# Patient Record
Sex: FEMALE | Race: OTHER | HISPANIC OR LATINO | Employment: OTHER | URBAN - METROPOLITAN AREA
[De-identification: names, ages, dates, MRNs, and addresses within clinical notes are randomized per-mention and may not be internally consistent; named-entity substitution may affect disease eponyms.]

---

## 2022-09-23 ENCOUNTER — APPOINTMENT (EMERGENCY)
Dept: CT IMAGING | Facility: HOSPITAL | Age: 62
End: 2022-09-23

## 2022-09-23 ENCOUNTER — HOSPITAL ENCOUNTER (EMERGENCY)
Facility: HOSPITAL | Age: 62
Discharge: HOME | End: 2022-09-23
Attending: EMERGENCY MEDICINE

## 2022-09-23 VITALS
DIASTOLIC BLOOD PRESSURE: 92 MMHG | TEMPERATURE: 98.8 F | SYSTOLIC BLOOD PRESSURE: 156 MMHG | WEIGHT: 133.6 LBS | HEART RATE: 74 BPM | OXYGEN SATURATION: 98 % | RESPIRATION RATE: 18 BRPM

## 2022-09-23 DIAGNOSIS — M54.50 ACUTE LOW BACK PAIN: Primary | ICD-10-CM

## 2022-09-23 DIAGNOSIS — M48.50XA SPINAL COMPRESSION FRACTURE (HCC): ICD-10-CM

## 2022-09-23 DIAGNOSIS — M54.2 ACUTE NECK PAIN: ICD-10-CM

## 2022-09-23 PROCEDURE — 72131 CT LUMBAR SPINE W/O DYE: CPT

## 2022-09-23 PROCEDURE — 72125 CT NECK SPINE W/O DYE: CPT

## 2022-09-23 PROCEDURE — 99284 EMERGENCY DEPT VISIT MOD MDM: CPT

## 2022-09-23 PROCEDURE — 99284 EMERGENCY DEPT VISIT MOD MDM: CPT | Performed by: EMERGENCY MEDICINE

## 2022-09-23 PROCEDURE — G1004 CDSM NDSC: HCPCS

## 2022-09-23 RX ORDER — ACETAMINOPHEN 325 MG/1
975 TABLET ORAL ONCE
Status: COMPLETED | OUTPATIENT
Start: 2022-09-23 | End: 2022-09-23

## 2022-09-23 RX ORDER — OXYCODONE HYDROCHLORIDE AND ACETAMINOPHEN 5; 325 MG/1; MG/1
1 TABLET ORAL EVERY 6 HOURS PRN
Qty: 20 TABLET | Refills: 0 | Status: SHIPPED | OUTPATIENT
Start: 2022-09-23 | End: 2022-10-03

## 2022-09-23 RX ORDER — LIDOCAINE 50 MG/G
2 PATCH TOPICAL ONCE
Status: DISCONTINUED | OUTPATIENT
Start: 2022-09-23 | End: 2022-09-24 | Stop reason: HOSPADM

## 2022-09-23 RX ORDER — LIDOCAINE 50 MG/G
2 PATCH TOPICAL ONCE
Status: DISCONTINUED | OUTPATIENT
Start: 2022-09-23 | End: 2022-09-23

## 2022-09-23 RX ADMIN — LIDOCAINE 2 PATCH: 50 PATCH TOPICAL at 19:55

## 2022-09-23 RX ADMIN — ACETAMINOPHEN 975 MG: 325 TABLET ORAL at 19:54

## 2022-09-24 NOTE — ED NOTES
Patient returned from [address] [facility]  09/23/22 2019

## 2022-09-24 NOTE — DISCHARGE INSTRUCTIONS
Puede westley Tylenol 650 mg cada 4-6 horas según sea necesario para el dolor  También puede aplicar Voltaren Gel 4 veces al día en áreas de dolor  Llame a allen médico para bryan reevaluación dentro de 2-3 días  Regrese al departamento de emergencias si desarrolla entumecimiento o debilidad en las piernas, disfunción intestinal o vesical, empeoramiento intenso del dolor, cualquier otro síntoma que le preocupe

## 2022-09-24 NOTE — ED PROVIDER NOTES
History  Chief Complaint   Patient presents with    Back Pain     Pt was working out yesterday and fell backwards  Lower back and R shoulder hurts  Tylenol 3 pm 2 tabs     78-year-old female with history of Parkinson's disease presents to the emergency department for neck and back pain  Patient says that she was stretching when she lost her balance and fell backwards yesterday  Denies any head strike  No loss of consciousness  No anticoagulant or antiplatelet agent use  Says that she was able to get up on her own has been ambulating but now has pain to her neck radiating into the right trapezius area and low back  No saddle anaesthesia, bowel or bladder dysfunction, lower extremity weakness or numbness  Back pain is moderate in severity, worse with movement and ambulation, constant, localized midline and right paraspinous, nonradiating  Denies headache, nchest pain, abdominal pain, extremity pain, focal neurologic symptoms, or any other injuries or complaints  None       Past Medical History:   Diagnosis Date    Parkinson's disease ([facility] [address] )        History reviewed  No pertinent surgical history  History reviewed  No pertinent family history  I have reviewed and agree with the history as documented  E-Cigarette/Vaping    E-Cigarette Use Never User      E-Cigarette/Vaping Substances    Nicotine No     THC No     CBD No     Flavoring No     Other No     Unknown No      Social History     Tobacco Use    Smoking status: Never Smoker    Smokeless tobacco: Never Used   Vaping Use    Vaping Use: Never used   Substance Use Topics    Alcohol use: Not Currently    Drug use: Never       Review of Systems   Constitutional: Negative  Negative for chills and fever  HENT: Negative  Negative for rhinorrhea  Eyes: Negative  Respiratory: Negative  Negative for cough and shortness of breath  Cardiovascular: Negative  Negative for chest pain and leg swelling  Gastrointestinal: Negative  Negative for abdominal pain, diarrhea, nausea and vomiting  Genitourinary: Negative  Negative for dysuria, flank pain and frequency  Musculoskeletal: Negative  Negative for back pain and neck pain  Skin: Negative  Negative for rash  Neurological: Negative  Negative for light-headedness and headaches  All other systems reviewed and are negative  Physical Exam  Physical Exam  Vitals and nursing note reviewed  Constitutional:       General: She is not in acute distress  Appearance: She is well-developed  HENT:      Head: Normocephalic and atraumatic  Comments: No craniofacial ecchymosis, crepitus, or deformity  No burgos's sign  No raccoon eyes  Mouth/Throat:      Mouth: Mucous membranes are moist    Eyes:      Pupils: Pupils are equal, round, and reactive to light  Cardiovascular:      Rate and Rhythm: Normal rate and regular rhythm  Pulses:           Radial pulses are 2+ on the right side and 2+ on the left side  Heart sounds: Normal heart sounds  No murmur heard  No friction rub  No gallop  Pulmonary:      Effort: Pulmonary effort is normal  No respiratory distress  Breath sounds: Normal breath sounds  No stridor  No wheezing, rhonchi or rales  Abdominal:      Palpations: Abdomen is soft  Tenderness: There is no abdominal tenderness  There is no guarding or rebound  Musculoskeletal:         General: Tenderness (lumbosacral midline and right paraspinous area  no stepoffs or deformities  ) present  No swelling  Normal range of motion  Cervical back: Normal range of motion and neck supple  Tenderness (mild right paraspinous tenderness  no stepoffs or deformities  ) present  Right lower leg: No edema  Left lower leg: No edema  Skin:     General: Skin is warm and dry  Capillary Refill: Capillary refill takes less than 2 seconds  Neurological:      Mental Status: She is alert and oriented to person, place, and time        Cranial Nerves: No cranial nerve deficit  Comments: 5/5 strength in bilateral lower extremities  Distal sensation intact  Vital Signs  ED Triage Vitals   Temperature Pulse Respirations Blood Pressure SpO2   09/23/22 1936 09/23/22 1936 09/23/22 1936 09/23/22 1936 09/23/22 1936   98 8 °F (37 1 °C) 74 18 156/92 98 %      Temp Source Heart Rate Source Patient Position - Orthostatic VS BP Location FiO2 (%)   09/23/22 1936 09/23/22 1936 09/23/22 1936 09/23/22 1936 --   Tympanic Monitor Sitting Left arm       Pain Score       09/23/22 1954       9           Vitals:    09/23/22 1936   BP: 156/92   Pulse: 74   Patient Position - Orthostatic VS: Sitting         Visual Acuity      ED Medications  Medications   lidocaine (LIDODERM) 5 % patch 2 patch (2 patches Topical Medication Applied 9/23/22 1955)   acetaminophen (TYLENOL) tablet 975 mg (975 mg Oral Given 9/23/22 1954)       Diagnostic Studies  Results Reviewed     None                 CT spine lumbar without contrast   Final Result by Zaid Harvey DO (09/23 2124)      Compression fracture superior endplate of L1 with mild height loss  No bony retropulsion  Multilevel degenerative changes as described  The study was marked in [facility] for immediate notification  Workstation performed: [id number]         CT spine cervical without contrast   Final Result by Zaid Harvey DO (09/23 2119)      No cervical spine fracture or traumatic malalignment  Incidental thyroid nodule(s) for which nonemergent thyroid ultrasound is recommended  Workstation performed: [id number]                    Procedures  Procedures         ED Course  ED Course as of 09/23/22 2242   Fri Sep 23, 2022   2152 Spoke with Dr Yasir Sherman, radiology regarding read:  most likely acute compression fx     2216 Spoke with Dr Clive Boyd from [address] [place] [address]  If neuro intact and can get around without issues, pain meds and follow up with NSG next week                                   SBIRT 22yo+    Flowsheet Row Most Recent Value   SBIRT (25 yo +)    In order to provide better care to our patients, we are screening all of our patients for alcohol and drug use  Would it be okay to ask you these screening questions? No Filed at: 09/23/2022 1940                      Disposition  Final diagnoses:   Acute low back pain   Acute neck pain   Spinal compression fracture ([address] )     Time reflects when diagnosis was documented in both MDM as applicable and the Disposition within this note     Time User Action Codes Description Comment    9/23/2022  8:48 PM Minor, Mireille Add [M54 50] Acute low back pain     9/23/2022  8:48 PM Minor, Mireille Add [M54 2] Acute neck pain     9/23/2022 10:12 PM Mliss Stamp Add [G08 90QC] Spinal compression fracture [facility])       ED Disposition     ED Disposition   Discharge    Condition   Stable    Date/Time   Fri Sep 23, 2022 10:12 PM    Comment   Clotilde Johnson discharge to home/self care  Follow-up Information     Follow up With Specialties Details Why Contact Info Additional Information    Your primary care doctor  Call in 1 day       [facility]   [address] [facility] [address] [facility] [address]  [address] [place], [place], [address] [phone]          Discharge Medication List as of 9/23/2022 10:14 PM      START taking these medications    Details   Diclofenac Sodium (VOLTAREN) 1 % Apply 2 g topically 4 (four) times a day, Starting Fri 9/23/2022, Normal      oxyCODONE-acetaminophen (Percocet) 5-325 mg per tablet Take 1 tablet by mouth every 6 (six) hours as needed for moderate pain for up to 10 days Max Daily Amount: 4 tablets, Starting Fri 9/23/2022, Until Mon 10/3/2022 at 2359, Normal             No discharge procedures on file      PDMP Review     None          ED Provider  Electronically Signed by           Jose Luis Marquez MD  09/23/22 7614

## 2023-10-27 ENCOUNTER — HOSPITAL ENCOUNTER (EMERGENCY)
Facility: HOSPITAL | Age: 63
Discharge: HOME | End: 2023-10-27
Attending: EMERGENCY MEDICINE
Payer: COMMERCIAL

## 2023-10-27 VITALS
TEMPERATURE: 98.1 F | DIASTOLIC BLOOD PRESSURE: 72 MMHG | OXYGEN SATURATION: 98 % | WEIGHT: 136.24 LBS | RESPIRATION RATE: 18 BRPM | SYSTOLIC BLOOD PRESSURE: 128 MMHG | HEART RATE: 87 BPM

## 2023-10-27 DIAGNOSIS — L02.91 ABSCESS: Primary | ICD-10-CM

## 2023-10-27 PROCEDURE — 99282 EMERGENCY DEPT VISIT SF MDM: CPT

## 2023-10-27 PROCEDURE — 99284 EMERGENCY DEPT VISIT MOD MDM: CPT | Performed by: EMERGENCY MEDICINE

## 2023-10-27 PROCEDURE — 10061 I&D ABSCESS COMP/MULTIPLE: CPT | Performed by: EMERGENCY MEDICINE

## 2023-10-27 RX ORDER — LIDOCAINE HYDROCHLORIDE AND EPINEPHRINE 10; 10 MG/ML; UG/ML
10 INJECTION, SOLUTION INFILTRATION; PERINEURAL ONCE
Status: COMPLETED | OUTPATIENT
Start: 2023-10-27 | End: 2023-10-27

## 2023-10-27 RX ORDER — TRAMADOL HYDROCHLORIDE 50 MG/1
100 TABLET ORAL DAILY PRN
COMMUNITY
Start: 2023-10-10

## 2023-10-27 RX ADMIN — LIDOCAINE HYDROCHLORIDE,EPINEPHRINE BITARTRATE 10 ML: 10; .01 INJECTION, SOLUTION INFILTRATION; PERINEURAL at 14:41

## 2023-10-27 NOTE — ED PROVIDER NOTES
History  Chief Complaint   Patient presents with    Abscess     Pt reports she feels "ball under skin" under her armpit that is painful and producing pus. States she noticed this last week. HPI  70year old female presenting with right axilla swelling. States that she gets abscesses in other areas often but first time in her right axilla. It has been producing pus but stopped and now swelling and pain is worse. Symptom started since 1 week ago. Patient reports no fever, chills, n/v, diarrhea. No complaints other than the abscess    Prior to Admission Medications   Prescriptions Last Dose Informant Patient Reported? Taking? Diclofenac Sodium (VOLTAREN) 1 %   No No   Sig: Apply 2 g topically 4 (four) times a day   carbidopa-levodopa (SINEMET)  mg per tablet   Yes Yes   Sig: Take 1.5 tablets by mouth 4 (four) times a day   traMADol (ULTRAM) 50 mg tablet   Yes Yes   Sig: Take 100 mg by mouth daily as needed      Facility-Administered Medications: None       Past Medical History:   Diagnosis Date    Parkinson's disease        Past Surgical History:   Procedure Laterality Date    US GUIDED THYROID BIOPSY  5/27/2021    US GUIDED THYROID BIOPSY  3/1/2021       History reviewed. No pertinent family history. I have reviewed and agree with the history as documented. E-Cigarette/Vaping    E-Cigarette Use Never User      E-Cigarette/Vaping Substances    Nicotine No     THC No     CBD No     Flavoring No     Other No     Unknown No      Social History     Tobacco Use    Smoking status: Never    Smokeless tobacco: Never   Vaping Use    Vaping Use: Never used   Substance Use Topics    Alcohol use: Not Currently    Drug use: Never       Review of Systems   Constitutional:  Negative for chills, diaphoresis, fever and unexpected weight change. HENT:  Negative for ear pain and sore throat. Eyes:  Negative for visual disturbance. Respiratory:  Negative for cough, chest tightness and shortness of breath. Cardiovascular:  Negative for chest pain and leg swelling. Gastrointestinal:  Negative for abdominal distention, abdominal pain, constipation, diarrhea, nausea and vomiting. Endocrine: Negative. Genitourinary:  Negative for difficulty urinating and dysuria. Musculoskeletal: Negative. Skin:         Swelling in right axill     Allergic/Immunologic: Negative. Neurological: Negative. Hematological: Negative. Psychiatric/Behavioral: Negative. All other systems reviewed and are negative. Physical Exam  Physical Exam  Vitals and nursing note reviewed. Constitutional:       General: She is not in acute distress. Appearance: Normal appearance. She is not ill-appearing. HENT:      Head: Normocephalic and atraumatic. Right Ear: External ear normal.      Left Ear: External ear normal.      Nose: Nose normal.      Mouth/Throat:      Mouth: Mucous membranes are moist.      Pharynx: Oropharynx is clear. Eyes:      General: No scleral icterus. Right eye: No discharge. Left eye: No discharge. Extraocular Movements: Extraocular movements intact. Conjunctiva/sclera: Conjunctivae normal.      Pupils: Pupils are equal, round, and reactive to light. Cardiovascular:      Rate and Rhythm: Normal rate and regular rhythm. Pulses: Normal pulses. Heart sounds: Normal heart sounds. Pulmonary:      Effort: Pulmonary effort is normal.      Breath sounds: Normal breath sounds. Abdominal:      General: Abdomen is flat. Bowel sounds are normal. There is no distension. Palpations: Abdomen is soft. Tenderness: There is no abdominal tenderness. There is no guarding or rebound. Musculoskeletal:         General: Normal range of motion. Cervical back: Normal range of motion and neck supple. Skin:     General: Skin is warm and dry. Capillary Refill: Capillary refill takes less than 2 seconds.       Comments: Fluctuant abscess in right axilla roughly 3x3 cm   Neurological:      General: No focal deficit present. Mental Status: She is alert and oriented to person, place, and time. Mental status is at baseline. Psychiatric:         Mood and Affect: Mood normal.         Behavior: Behavior normal.         Thought Content: Thought content normal.         Judgment: Judgment normal.         Vital Signs  ED Triage Vitals   Temperature Pulse Respirations Blood Pressure SpO2   10/27/23 1419 10/27/23 1419 10/27/23 1426 10/27/23 1419 10/27/23 1419   98.1 °F (36.7 °C) 87 18 128/72 98 %      Temp Source Heart Rate Source Patient Position - Orthostatic VS BP Location FiO2 (%)   10/27/23 1419 -- 10/27/23 1419 10/27/23 1419 --   Oral  Sitting Right arm       Pain Score       --                  Vitals:    10/27/23 1419   BP: 128/72   Pulse: 87   Patient Position - Orthostatic VS: Sitting         Visual Acuity      ED Medications  Medications   lidocaine-epinephrine (XYLOCAINE/EPINEPHRINE) 1 %-1:100,000 injection 10 mL (10 mL Infiltration Given by Other 10/27/23 1441)       Diagnostic Studies  Results Reviewed       None                   No orders to display              Procedures  Incision and drain    Date/Time: 10/27/2023 2:33 PM    Performed by: Kurt Mendoza MD  Authorized by: Kurt Mendoza MD  Universal Protocol:  Consent: Verbal consent obtained. Consent given by: patient  Patient understanding: patient states understanding of the procedure being performed  Patient consent: the patient's understanding of the procedure matches consent given  Patient identity confirmed: hospital-assigned identification number    Patient location:  ED  Location:     Type:  Abscess    Size:  3x3 cm    Location: right axila.   Pre-procedure details:     Skin preparation:  Chloraprep  Procedure details:     Incision types:  Single straight    Scalpel blade:  11    Approach:  Open    Incision depth:  Subcutaneous    Wound management:  Probed and deloculated    Drainage:  Purulent Drainage amount: Moderate    Wound treatment:  Wound left open  Post-procedure details:     Patient tolerance of procedure: Tolerated well, no immediate complications           ED Course                               SBIRT 22yo+      Flowsheet Row Most Recent Value   Initial Alcohol Screen: US AUDIT-C     1. How often do you have a drink containing alcohol? 0 Filed at: 10/27/2023 1424   2. How many drinks containing alcohol do you have on a typical day you are drinking? 0 Filed at: 10/27/2023 1424   3a. Male UNDER 65: How often do you have five or more drinks on one occasion? 0 Filed at: 10/27/2023 1424   3b. FEMALE Any Age, or MALE 65+: How often do you have 4 or more drinks on one occassion? 0 Filed at: 10/27/2023 1424   Audit-C Score 0 Filed at: 10/27/2023 1424   LUIS: How many times in the past year have you. .. Used an illegal drug or used a prescription medication for non-medical reasons? Never Filed at: 10/27/2023 1424                      Medical Decision Making  70year old female with right armpit abscess  Abscess drained via I&D   Patient tolerated procedure well   Patient discharged with return precaution provided       Problems Addressed:  Abscess: acute illness or injury    Risk  Prescription drug management. Disposition  Final diagnoses:   Abscess     Time reflects when diagnosis was documented in both MDM as applicable and the Disposition within this note       Time User Action Codes Description Comment    10/27/2023  2:57 PM Magdiel Horowitz Add [L02.91] Abscess           ED Disposition       ED Disposition   Discharge    Condition   Stable    Date/Time   Fri Oct 27, 2023 José Luis discharge to home/self care.                    Follow-up Information       Follow up With Specialties Details Why Contact Info Additional [address] [facility] Emergency Department Emergency Medicine Go to  If symptoms worsen [address] [facility] [address]  [address] [facility] Emergency Department            Discharge Medication List as of 10/27/2023  2:57 PM        CONTINUE these medications which have NOT CHANGED    Details   carbidopa-levodopa (SINEMET)  mg per tablet Take 1.5 tablets by mouth 4 (four) times a day, Starting Sat 7/8/2023, Historical Med      traMADol (ULTRAM) 50 mg tablet Take 100 mg by mouth daily as needed, Starting Tue 10/10/2023, Historical Med      Diclofenac Sodium (VOLTAREN) 1 % Apply 2 g topically 4 (four) times a day, Starting Fri 9/23/2022, Normal             No discharge procedures on file.     PDMP Review       None            ED Provider  Electronically Signed by             Arelis Jeffery MD  10/27/23 4929

## 2024-10-03 ENCOUNTER — HOSPITAL ENCOUNTER (EMERGENCY)
Facility: HOSPITAL | Age: 64
Discharge: HOME | End: 2024-10-03
Attending: EMERGENCY MEDICINE
Payer: COMMERCIAL

## 2024-10-03 ENCOUNTER — APPOINTMENT (EMERGENCY)
Dept: CT IMAGING | Facility: HOSPITAL | Age: 64
End: 2024-10-03
Payer: COMMERCIAL

## 2024-10-03 VITALS
RESPIRATION RATE: 16 BRPM | OXYGEN SATURATION: 98 % | HEART RATE: 80 BPM | TEMPERATURE: 98.3 F | WEIGHT: 135.58 LBS | DIASTOLIC BLOOD PRESSURE: 81 MMHG | SYSTOLIC BLOOD PRESSURE: 128 MMHG

## 2024-10-03 DIAGNOSIS — N39.0 UTI (URINARY TRACT INFECTION): ICD-10-CM

## 2024-10-03 DIAGNOSIS — R10.13 EPIGASTRIC ABDOMINAL PAIN: Primary | ICD-10-CM

## 2024-10-03 LAB
ALBUMIN SERPL BCG-MCNC: 4.3 G/DL (ref 3.5–5)
ALP SERPL-CCNC: 64 U/L (ref 34–104)
ALT SERPL W P-5'-P-CCNC: <3 U/L (ref 7–52)
ANION GAP SERPL CALCULATED.3IONS-SCNC: 9 MMOL/L (ref 4–13)
AST SERPL W P-5'-P-CCNC: 16 U/L (ref 13–39)
BACTERIA UR QL AUTO: ABNORMAL /HPF
BASOPHILS # BLD AUTO: 0.07 THOUSANDS/ÂΜL (ref 0–0.1)
BASOPHILS NFR BLD AUTO: 1 % (ref 0–1)
BILIRUB SERPL-MCNC: 0.27 MG/DL (ref 0.2–1)
BILIRUB UR QL STRIP: NEGATIVE
BUN SERPL-MCNC: 19 MG/DL (ref 5–25)
CALCIUM SERPL-MCNC: 9.5 MG/DL (ref 8.4–10.2)
CHLORIDE SERPL-SCNC: 103 MMOL/L (ref 96–108)
CLARITY UR: CLEAR
CO2 SERPL-SCNC: 28 MMOL/L (ref 21–32)
COLOR UR: ABNORMAL
CREAT SERPL-MCNC: 0.87 MG/DL (ref 0.6–1.3)
EOSINOPHIL # BLD AUTO: 0.3 THOUSAND/ÂΜL (ref 0–0.61)
EOSINOPHIL NFR BLD AUTO: 6 % (ref 0–6)
ERYTHROCYTE [DISTWIDTH] IN BLOOD BY AUTOMATED COUNT: 14 % (ref 11.6–15.1)
GFR SERPL CREATININE-BSD FRML MDRD: 69 ML/MIN/1.73SQ M
GLUCOSE SERPL-MCNC: 100 MG/DL (ref 65–140)
GLUCOSE UR STRIP-MCNC: NEGATIVE MG/DL
HCT VFR BLD AUTO: 38.3 % (ref 34.8–46.1)
HGB BLD-MCNC: 12.9 G/DL (ref 11.5–15.4)
HGB UR QL STRIP.AUTO: NEGATIVE
IMM GRANULOCYTES # BLD AUTO: 0.01 THOUSAND/UL (ref 0–0.2)
IMM GRANULOCYTES NFR BLD AUTO: 0 % (ref 0–2)
KETONES UR STRIP-MCNC: ABNORMAL MG/DL
LEUKOCYTE ESTERASE UR QL STRIP: 100
LIPASE SERPL-CCNC: 12 U/L (ref 11–82)
LYMPHOCYTES # BLD AUTO: 1.31 THOUSANDS/ÂΜL (ref 0.6–4.47)
LYMPHOCYTES NFR BLD AUTO: 24 % (ref 14–44)
MCH RBC QN AUTO: 30.1 PG (ref 26.8–34.3)
MCHC RBC AUTO-ENTMCNC: 33.7 G/DL (ref 31.4–37.4)
MCV RBC AUTO: 89 FL (ref 82–98)
MONOCYTES # BLD AUTO: 0.47 THOUSAND/ÂΜL (ref 0.17–1.22)
MONOCYTES NFR BLD AUTO: 9 % (ref 4–12)
MUCOUS THREADS UR QL AUTO: ABNORMAL
NEUTROPHILS # BLD AUTO: 3.2 THOUSANDS/ÂΜL (ref 1.85–7.62)
NEUTS SEG NFR BLD AUTO: 60 % (ref 43–75)
NITRITE UR QL STRIP: NEGATIVE
NON-SQ EPI CELLS URNS QL MICRO: ABNORMAL /HPF
NRBC BLD AUTO-RTO: 0 /100 WBCS
PH UR STRIP.AUTO: 5 [PH]
PLATELET # BLD AUTO: 291 THOUSANDS/UL (ref 149–390)
PMV BLD AUTO: 8.7 FL (ref 8.9–12.7)
POTASSIUM SERPL-SCNC: 3.7 MMOL/L (ref 3.5–5.3)
PROT SERPL-MCNC: 7.8 G/DL (ref 6.4–8.4)
PROT UR STRIP-MCNC: ABNORMAL MG/DL
RBC # BLD AUTO: 4.29 MILLION/UL (ref 3.81–5.12)
RBC #/AREA URNS AUTO: ABNORMAL /HPF
SODIUM SERPL-SCNC: 140 MMOL/L (ref 135–147)
SP GR UR STRIP.AUTO: 1.03 (ref 1–1.04)
UROBILINOGEN UA: NEGATIVE MG/DL
WBC # BLD AUTO: 5.36 THOUSAND/UL (ref 4.31–10.16)
WBC #/AREA URNS AUTO: ABNORMAL /HPF

## 2024-10-03 PROCEDURE — 81003 URINALYSIS AUTO W/O SCOPE: CPT | Performed by: EMERGENCY MEDICINE

## 2024-10-03 PROCEDURE — 96374 THER/PROPH/DIAG INJ IV PUSH: CPT

## 2024-10-03 PROCEDURE — 36415 COLL VENOUS BLD VENIPUNCTURE: CPT | Performed by: EMERGENCY MEDICINE

## 2024-10-03 PROCEDURE — 85025 COMPLETE CBC W/AUTO DIFF WBC: CPT | Performed by: EMERGENCY MEDICINE

## 2024-10-03 PROCEDURE — 96375 TX/PRO/DX INJ NEW DRUG ADDON: CPT

## 2024-10-03 PROCEDURE — 81001 URINALYSIS AUTO W/SCOPE: CPT | Performed by: EMERGENCY MEDICINE

## 2024-10-03 PROCEDURE — 99284 EMERGENCY DEPT VISIT MOD MDM: CPT

## 2024-10-03 PROCEDURE — 96361 HYDRATE IV INFUSION ADD-ON: CPT

## 2024-10-03 PROCEDURE — 99285 EMERGENCY DEPT VISIT HI MDM: CPT | Performed by: EMERGENCY MEDICINE

## 2024-10-03 PROCEDURE — 80053 COMPREHEN METABOLIC PANEL: CPT | Performed by: EMERGENCY MEDICINE

## 2024-10-03 PROCEDURE — 74177 CT ABD & PELVIS W/CONTRAST: CPT

## 2024-10-03 PROCEDURE — 83690 ASSAY OF LIPASE: CPT | Performed by: EMERGENCY MEDICINE

## 2024-10-03 RX ORDER — MAGNESIUM HYDROXIDE/ALUMINUM HYDROXICE/SIMETHICONE 120; 1200; 1200 MG/30ML; MG/30ML; MG/30ML
30 SUSPENSION ORAL ONCE
Status: COMPLETED | OUTPATIENT
Start: 2024-10-03 | End: 2024-10-03

## 2024-10-03 RX ORDER — ONDANSETRON 2 MG/ML
4 INJECTION INTRAMUSCULAR; INTRAVENOUS ONCE
Status: COMPLETED | OUTPATIENT
Start: 2024-10-03 | End: 2024-10-03

## 2024-10-03 RX ORDER — KETOROLAC TROMETHAMINE 30 MG/ML
15 INJECTION, SOLUTION INTRAMUSCULAR; INTRAVENOUS ONCE
Status: COMPLETED | OUTPATIENT
Start: 2024-10-03 | End: 2024-10-03

## 2024-10-03 RX ORDER — CEPHALEXIN 500 MG/1
500 CAPSULE ORAL EVERY 12 HOURS SCHEDULED
Qty: 14 CAPSULE | Refills: 0 | Status: SHIPPED | OUTPATIENT
Start: 2024-10-03 | End: 2024-10-10

## 2024-10-03 RX ORDER — CEPHALEXIN 500 MG/1
500 CAPSULE ORAL ONCE
Status: COMPLETED | OUTPATIENT
Start: 2024-10-03 | End: 2024-10-03

## 2024-10-03 RX ADMIN — ALUMINUM HYDROXIDE, MAGNESIUM HYDROXIDE, AND DIMETHICONE 30 ML: 200; 20; 200 SUSPENSION ORAL at 21:37

## 2024-10-03 RX ADMIN — KETOROLAC TROMETHAMINE 15 MG: 30 INJECTION, SOLUTION INTRAMUSCULAR; INTRAVENOUS at 19:17

## 2024-10-03 RX ADMIN — CEPHALEXIN 500 MG: 500 CAPSULE ORAL at 21:36

## 2024-10-03 RX ADMIN — ONDANSETRON 4 MG: 2 INJECTION INTRAMUSCULAR; INTRAVENOUS at 19:18

## 2024-10-03 RX ADMIN — SODIUM CHLORIDE 1000 ML: 0.9 INJECTION, SOLUTION INTRAVENOUS at 19:17

## 2024-10-03 RX ADMIN — IOHEXOL 80 ML: 350 INJECTION, SOLUTION INTRAVENOUS at 20:19

## 2024-10-04 NOTE — ED PROVIDER NOTES
Final diagnoses:   Epigastric abdominal pain   UTI (urinary tract infection)     ED Disposition       ED Disposition   Discharge    Condition   Stable    Date/Time   Thu Oct 3, 2024  9:06 PM    Comment   Clotilde Johnson discharge to home/self care.                   Assessment & Plan       Medical Decision Making  64-year-old female presenting emerged department with epigastric and lower abdominal pain.  Differential diagnosis includes pancreatitis, appendicitis, diverticulitis, cholecystitis, UTI.  CT abdomen pelvis unremarkable.  Laboratory evaluation shows no leukocytosis.  UA shows signs of UTI, likely cause of symptoms.  Will treat with Keflex.  Maalox for epigastric pain.    Amount and/or Complexity of Data Reviewed  Labs: ordered.  Radiology: ordered.    Risk  OTC drugs.  Prescription drug management.             Medications   sodium chloride 0.9 % bolus 1,000 mL (0 mL Intravenous Stopped 10/3/24 2136)   ondansetron (ZOFRAN) injection 4 mg (4 mg Intravenous Given 10/3/24 1918)   ketorolac (TORADOL) injection 15 mg (15 mg Intravenous Given 10/3/24 1917)   iohexol (OMNIPAQUE) 350 MG/ML injection (MULTI-DOSE) 80 mL (80 mL Intravenous Given 10/3/24 2019)   cephalexin (KEFLEX) capsule 500 mg (500 mg Oral Given 10/3/24 2136)   aluminum-magnesium hydroxide-simethicone (MAALOX) oral suspension 30 mL (30 mL Oral Given 10/3/24 2137)       ED Risk Strat Scores                                               History of Present Illness       Chief Complaint   Patient presents with    Epigastric Pain     Reports epigastric pain since Tuesday that is constant along with vomiting.        Past Medical History:   Diagnosis Date    Parkinson's disease (HCC)       Past Surgical History:   Procedure Laterality Date    US GUIDED THYROID BIOPSY  5/27/2021    US GUIDED THYROID BIOPSY  3/1/2021      History reviewed. No pertinent family history.   Social History     Tobacco Use    Smoking status: Never    Smokeless tobacco: Never    Vaping Use    Vaping status: Never Used   Substance Use Topics    Alcohol use: Not Currently    Drug use: Never      E-Cigarette/Vaping    E-Cigarette Use Never User       E-Cigarette/Vaping Substances    Nicotine No     THC No     CBD No     Flavoring No     Other No     Unknown No       I have reviewed and agree with the history as documented.     64-year-old female presenting to the emergency department with epigastric abdominal pain associate with nausea and vomiting.  Onset 1 day ago.  No diarrhea.  No fever chest pain no cough congestion rhinorrhea.  Also having some back pain.        Review of Systems   Constitutional:  Negative for chills and fever.   HENT:  Negative for ear pain and sore throat.    Eyes:  Negative for pain and visual disturbance.   Respiratory:  Negative for cough and shortness of breath.    Cardiovascular:  Negative for chest pain and palpitations.   Gastrointestinal:  Positive for abdominal pain, nausea and vomiting.   Genitourinary:  Negative for dysuria and hematuria.   Musculoskeletal:  Positive for back pain. Negative for arthralgias.   Skin:  Negative for color change and rash.   Neurological:  Negative for seizures and syncope.   All other systems reviewed and are negative.          Objective       ED Triage Vitals [10/03/24 1817]   Temperature Pulse Blood Pressure Respirations SpO2 Patient Position - Orthostatic VS   98.3 °F (36.8 °C) 80 128/81 16 98 % Lying      Temp src Heart Rate Source BP Location FiO2 (%) Pain Score    -- -- Right arm -- --      Vitals      Date and Time Temp Pulse SpO2 Resp BP Pain Score FACES Pain Rating User   10/03/24 1817 98.3 °F (36.8 °C) 80 98 % 16 128/81 -- -- CH            Physical Exam  Vitals and nursing note reviewed.   Constitutional:       General: She is not in acute distress.     Appearance: She is well-developed.   HENT:      Head: Normocephalic and atraumatic.   Eyes:      Conjunctiva/sclera: Conjunctivae normal.   Cardiovascular:      Rate  and Rhythm: Normal rate and regular rhythm.      Heart sounds: No murmur heard.  Pulmonary:      Effort: Pulmonary effort is normal. No respiratory distress.      Breath sounds: Normal breath sounds.   Abdominal:      Palpations: Abdomen is soft.      Tenderness: There is abdominal tenderness.      Comments: Epigastric tenderness to palpation without rebound or guarding.  Suprapubic tenderness to palpation without rebound or guarding.   Musculoskeletal:         General: No swelling.      Cervical back: Neck supple.   Skin:     General: Skin is warm and dry.      Capillary Refill: Capillary refill takes less than 2 seconds.   Neurological:      Mental Status: She is alert.   Psychiatric:         Mood and Affect: Mood normal.         Results Reviewed       Procedure Component Value Units Date/Time    Comprehensive metabolic panel [[id number]]  (Abnormal) Collected: 10/03/24 1914    Lab Status: Final result Specimen: Blood from Arm, Right Updated: 10/03/24 2008     Sodium 140 mmol/L      Potassium 3.7 mmol/L      Chloride 103 mmol/L      CO2 28 mmol/L      ANION GAP 9 mmol/L      BUN 19 mg/dL      Creatinine 0.87 mg/dL      Glucose 100 mg/dL      Calcium 9.5 mg/dL      AST 16 U/L      ALT <3 U/L      Alkaline Phosphatase 64 U/L      Total Protein 7.8 g/dL      Albumin 4.3 g/dL      Total Bilirubin 0.27 mg/dL      eGFR 69 ml/min/1.73sq m     Narrative:      [employer] Kid[employer] guidelines for Chronic Kidney Disease (CKD):     Stage 1 with normal or high GFR (GFR > 90 mL/min/1.73 square meters)    Stage 2 Mild CKD (GFR = 60-89 mL/min/1.73 square meters)    Stage 3A Moderate CKD (GFR = 45-59 mL/min/1.73 square meters)    Stage 3B Moderate CKD (GFR = 30-44 mL/min/1.73 square meters)    Stage 4 Severe CKD (GFR = 15-29 mL/min/1.73 square meters)    Stage 5 End Stage CKD (GFR <15 mL/min/1.73 square meters)  Note: GFR calculation is accurate only with a steady state creatinine    Lipase [[id number]]  (Normal)  Collected: 10/03/24 1914    Lab Status: Final result Specimen: Blood from Arm, Right Updated: 10/03/24 2008     Lipase 12 u/L     Urine Microscopic [[id number]]  (Abnormal) Collected: 10/03/24 1914    Lab Status: Final result Specimen: Urine, Clean Catch Updated: 10/03/24 1931     RBC, UA None Seen /hpf      WBC, UA 4-10 /hpf      Epithelial Cells Moderate /hpf      Bacteria, UA Occasional /hpf      MUCUS THREADS Moderate    UA w Reflex to Microscopic w Reflex to Culture [[id number]]  (Abnormal) Collected: 10/03/24 1914    Lab Status: Final result Specimen: Urine, Clean Catch Updated: 10/03/24 1925     Color, UA Elida     Clarity, UA Clear     Specific Gravity, UA 1.030     pH, UA 5.0     Leukocytes, .0     Nitrite, UA Negative     Protein, UA 30 (1+) mg/dl      Glucose, UA Negative mg/dl      Ketones, UA 5 (Trace) mg/dl      Bilirubin, UA Negative     Occult Blood, UA Negative     UROBILINOGEN UA Negative mg/dL     CBC and differential [[id number]]  (Abnormal) Collected: 10/03/24 1914    Lab Status: Final result Specimen: Blood from Arm, Right Updated: 10/03/24 1922     WBC 5.36 Thousand/uL      RBC 4.29 Million/uL      Hemoglobin 12.9 g/dL      Hematocrit 38.3 %      MCV 89 fL      MCH 30.1 pg      MCHC 33.7 g/dL      RDW 14.0 %      MPV 8.7 fL      Platelets 291 Thousands/uL      nRBC 0 /100 WBCs      Segmented % 60 %      Immature Grans % 0 %      Lymphocytes % 24 %      Monocytes % 9 %      Eosinophils Relative 6 %      Basophils Relative 1 %      Absolute Neutrophils 3.20 Thousands/µL      Absolute Immature Grans 0.01 Thousand/uL      Absolute Lymphocytes 1.31 Thousands/µL      Absolute Monocytes 0.47 Thousand/µL      Eosinophils Absolute 0.30 Thousand/µL      Basophils Absolute 0.07 Thousands/µL             CT abdomen pelvis with contrast   Final Interpretation by Jose Juan Tran DO (10/03 2055)      No acute findings in the abdomen or pelvis.      Atrophic/dysmorphic left kidney with nonobstructing calculi  measuring up to 1.4 cm         Workstation performed: [id number]             Procedures    ED Medication and Procedure Management   Prior to Admission Medications   Prescriptions Last Dose Informant Patient Reported? Taking?   Diclofenac Sodium (VOLTAREN) 1 %   No No   Sig: Apply 2 g topically 4 (four) times a day   carbidopa-levodopa (SINEMET)  mg per tablet   Yes No   Sig: Take 1.5 tablets by mouth 4 (four) times a day   traMADol (ULTRAM) 50 mg tablet   Yes No   Sig: Take 100 mg by mouth daily as needed      Facility-Administered Medications: None     Discharge Medication List as of 10/3/2024  9:09 PM        START taking these medications    Details   aluminum-magnesium hydroxide 200-200 MG/5ML suspension Take 15 mL by mouth every 6 (six) hours as needed for heartburn, Starting Thu 10/3/2024, Normal      cephalexin (KEFLEX) 500 mg capsule Take 1 capsule (500 mg total) by mouth every 12 (twelve) hours for 7 days, Starting Thu 10/3/2024, Until Thu 10/10/2024, Normal           CONTINUE these medications which have NOT CHANGED    Details   carbidopa-levodopa (SINEMET)  mg per tablet Take 1.5 tablets by mouth 4 (four) times a day, Starting Sat 7/8/2023, Historical Med      Diclofenac Sodium (VOLTAREN) 1 % Apply 2 g topically 4 (four) times a day, Starting Fri 9/23/2022, Normal      traMADol (ULTRAM) 50 mg tablet Take 100 mg by mouth daily as needed, Starting Tue 10/10/2023, Historical Med           No discharge procedures on file.  ED SEPSIS DOCUMENTATION   Time reflects when diagnosis was documented in both MDM as applicable and the Disposition within this note       Time User Action Codes Description Comment    10/3/2024  9:06 PM Eriberto Valentino [R10.13] Epigastric abdominal pain     10/3/2024  9:06 PM Eriberto Valentino [N39.0] UTI (urinary tract infection)                  Eriberto Valentino MD  10/03/24 2031

## 2025-03-14 ENCOUNTER — HOSPITAL ENCOUNTER (EMERGENCY)
Facility: HOSPITAL | Age: 65
Discharge: HOME | End: 2025-03-14
Attending: EMERGENCY MEDICINE
Payer: COMMERCIAL

## 2025-03-14 VITALS
RESPIRATION RATE: 20 BRPM | HEART RATE: 89 BPM | SYSTOLIC BLOOD PRESSURE: 143 MMHG | TEMPERATURE: 98.3 F | OXYGEN SATURATION: 99 % | WEIGHT: 128.31 LBS | DIASTOLIC BLOOD PRESSURE: 85 MMHG

## 2025-03-14 DIAGNOSIS — M54.30 SCIATICA: Primary | ICD-10-CM

## 2025-03-14 PROCEDURE — 99284 EMERGENCY DEPT VISIT MOD MDM: CPT

## 2025-03-14 PROCEDURE — 99282 EMERGENCY DEPT VISIT SF MDM: CPT

## 2025-03-14 RX ORDER — GABAPENTIN 100 MG/1
100 CAPSULE ORAL 3 TIMES DAILY
Qty: 90 CAPSULE | Refills: 0 | Status: SHIPPED | OUTPATIENT
Start: 2025-03-14 | End: 2025-04-13

## 2025-03-14 NOTE — ED PROVIDER NOTES
Time reflects when diagnosis was documented in both MDM as applicable and the Disposition within this note       Time User Action Codes Description Comment    3/14/2025  3:38 PM Mariano Melchor Add [M54.30] Sciatica           ED Disposition       ED Disposition   Discharge    Condition   Stable    Date/Time   Fri Mar 14, 2025  3:38 PM    Comment   Clotilde Johnson discharge to home/self care.                   Assessment & Plan       Medical Decision Making  Patient is a 64-year-old female coming in for evaluation of left-sided sciatica.  Patient is in no acute distress this time.  Patient symptoms are consistent with sciatica, atraumatic in origin.  Will refer to comprehensive spine, start patient on gabapentin, was warned of sedating side effects.  Patient in no acute distress at this time, discharged home.  Denies bladder or bowel incontinence, denies saddle anesthesia.  No concern for cauda equina at this time.  Patient has no urinary symptoms, along with sciatica, unlikely to be urinary in origin    Risk  Prescription drug management.             Medications - No data to display    ED Risk Strat Scores                            SBIRT 20yo+      Flowsheet Row Most Recent Value   Initial Alcohol Screen: US AUDIT-C     1. How often do you have a drink containing alcohol? 0 Filed at: 03/14/2025 1518   2. How many drinks containing alcohol do you have on a typical day you are drinking?  0 Filed at: 03/14/2025 1518   3a. Male UNDER 65: How often do you have five or more drinks on one occasion? 0 Filed at: 03/14/2025 1518   3b. FEMALE Any Age, or MALE 65+: How often do you have 4 or more drinks on one occassion? 0 Filed at: 03/14/2025 1518   Audit-C Score 0 Filed at: 03/14/2025 1518   LUIS: How many times in the past year have you...    Used an illegal drug or used a prescription medication for non-medical reasons? Never Filed at: 03/14/2025 1518                            History of Present Illness       Chief  Complaint   Patient presents with    Leg Pain     Pt reports (via ) that early yesterday she started with left leg, knee, and ankle pain. Reports she was just walking when the pain started, denies strain or injury. Took tramadol at 1700 yesterday.       Past Medical History:   Diagnosis Date    Parkinson's disease (HCC)       Past Surgical History:   Procedure Laterality Date    US GUIDED THYROID BIOPSY  5/27/2021    US GUIDED THYROID BIOPSY  3/1/2021      History reviewed. No pertinent family history.   Social History     Tobacco Use    Smoking status: Never    Smokeless tobacco: Never   Vaping Use    Vaping status: Never Used   Substance Use Topics    Alcohol use: Not Currently    Drug use: Never      E-Cigarette/Vaping    E-Cigarette Use Never User       E-Cigarette/Vaping Substances    Nicotine No     THC No     CBD No     Flavoring No     Other No     Unknown No       I have reviewed and agree with the history as documented.     Patient is a 64 year old female coming in for evaluation of left lumbar back pain, radiating down the posterior of her left leg. Reports the pain feels burning. Has taken half a tramadol with minimal relief. Patient does have a history of parkinsons. Pain started yesterday, non-traumatic in origin. Denies bladder or bowel incontinent, denies saddle anesthesia, paresthesias, or numbness      Leg Pain  Associated symptoms: back pain        Review of Systems   Gastrointestinal:  Negative for abdominal pain, nausea and vomiting.   Genitourinary:  Negative for difficulty urinating.   Musculoskeletal:  Positive for back pain. Negative for joint swelling.           Objective       ED Triage Vitals [03/14/25 1517]   Temperature Pulse Blood Pressure Respirations SpO2 Patient Position - Orthostatic VS   98.3 °F (36.8 °C) 89 143/85 20 99 % Sitting      Temp Source Heart Rate Source BP Location FiO2 (%) Pain Score    Oral Monitor Left arm -- --      Vitals      Date and Time Temp Pulse  SpO2 Resp BP Pain Score FACES Pain Rating User   03/14/25 1517 98.3 °F (36.8 °C) 89 99 % 20 143/85 -- -- RS            Physical Exam  Vitals reviewed.   Constitutional:       Appearance: Normal appearance. She is normal weight.   HENT:      Head: Normocephalic and atraumatic.      Right Ear: External ear normal.      Left Ear: External ear normal.      Nose: Nose normal.   Eyes:      Conjunctiva/sclera: Conjunctivae normal.   Cardiovascular:      Rate and Rhythm: Normal rate.   Pulmonary:      Effort: Pulmonary effort is normal.   Musculoskeletal:         General: Tenderness present. No swelling. Normal range of motion.      Cervical back: Normal range of motion.      Comments: Ttp of left lumbar region. Normal sensation in legs. 2+ posterior tibial pulse bilaterally   Skin:     General: Skin is warm and dry.   Neurological:      Mental Status: She is alert.         Results Reviewed       None            No orders to display       Procedures    ED Medication and Procedure Management   Prior to Admission Medications   Prescriptions Last Dose Informant Patient Reported? Taking?   Diclofenac Sodium (VOLTAREN) 1 %   No No   Sig: Apply 2 g topically 4 (four) times a day   aluminum-magnesium hydroxide 200-200 MG/5ML suspension   No No   Sig: Take 15 mL by mouth every 6 (six) hours as needed for heartburn   carbidopa-levodopa (SINEMET)  mg per tablet   Yes No   Sig: Take 1.5 tablets by mouth 4 (four) times a day   traMADol (ULTRAM) 50 mg tablet   Yes No   Sig: Take 100 mg by mouth daily as needed      Facility-Administered Medications: None     Discharge Medication List as of 3/14/2025  3:39 PM        START taking these medications    Details   gabapentin (Neurontin) 100 mg capsule Take 1 capsule (100 mg total) by mouth 3 (three) times a day, Starting Fri 3/14/2025, Until Sun 4/13/2025, Normal           CONTINUE these medications which have NOT CHANGED    Details   aluminum-magnesium hydroxide 200-200 MG/5ML  suspension Take 15 mL by mouth every 6 (six) hours as needed for heartburn, Starting Thu 10/3/2024, Normal      carbidopa-levodopa (SINEMET)  mg per tablet Take 1.5 tablets by mouth 4 (four) times a day, Starting Sat 7/8/2023, Historical Med      Diclofenac Sodium (VOLTAREN) 1 % Apply 2 g topically 4 (four) times a day, Starting Fri 9/23/2022, Normal      traMADol (ULTRAM) 50 mg tablet Take 100 mg by mouth daily as needed, Starting Tue 10/10/2023, Historical Med             ED SEPSIS DOCUMENTATION   Time reflects when diagnosis was documented in both MDM as applicable and the Disposition within this note       Time User Action Codes Description Comment    3/14/2025  3:38 PM Mariano Melchor Add [M54.30] Moises Melchor PA-C  03/14/25 1746

## 2025-03-14 NOTE — Clinical Note
Clotilde Johnson was seen and treated in our emergency department on 3/14/2025.    No restrictions            Diagnosis:     Clotilde  .    She may return on this date: 03/16/2025         If you have any questions or concerns, please don't hesitate to call.      Mariano Melchor PA-C    ______________________________           _______________          _______________  Hospital Representative                              Date                                Time

## 2025-03-17 ENCOUNTER — NURSE TRIAGE (OUTPATIENT)
Dept: PHYSICAL THERAPY | Facility: OTHER | Age: 65
End: 2025-03-17

## 2025-03-17 DIAGNOSIS — M54.42 ACUTE LEFT-SIDED LOW BACK PAIN WITH LEFT-SIDED SCIATICA: Primary | ICD-10-CM

## 2025-03-17 NOTE — TELEPHONE ENCOUNTER
"Pashto speaking, needs IS.    Additional Information   Negative: Is this related to a work injury?   Negative: Is this related to an MVA?   Negative: Are you currently recieving homecare services?    Background - Initial Assessment  Clinical complaint: ED visit on 03/14 due to Left Lower Back Pain that started on Thursday 03/13. Hx of back pain and neck \"disc sx\" in 2019 or 2019, hx of parkinson. Patient states this back pain radiates down into the left hip and down to the left leg, all the way down to the ankle. Pain also radiates to her upper back and neck. Numbness and tingling in her toes on both feet. NKI (not recently), pt did fell last year, landed on her back. Not seeing a spine specialist for this pain. She is established with [facility] for Parkinson. MRI of lumbar spine in 2022. Patient states pain is intermittent, worse when standing or walking. Patient described pain as burning, aching and pulling. Patient is taking Gabapentin.  Date of onset: Thursday 03/13 ( new flare up)  Frequency of pain: intermittent  Quality of pain: aching, burning, numb, pulling and tingling.    Protocols used: [facility] Protocol    "

## 2025-03-17 NOTE — TELEPHONE ENCOUNTER
Additional Information   Negative: Has the patient had unexplained weight loss?   Negative: Does the patient have a fever?   Negative: Is the patient experiencing urine retention?   Negative: Is the patient experiencing blood in sputum?   Negative: Is the patient experiencing acute drop foot or paralysis?   Negative: Has the patient experienced major trauma? (fall from height, high speed collision, direct blow to spine) and is also experiencing nausea, light-headedness, or loss of consciousness?   Negative: Is this a chronic condition?    Protocols used: [facility] Protocol  The following encounter was completed with the assistance of IKE SHAH  ID#  [id number]  Nurse completed the triage and NO RF s/s were present.  Referral entered for the [facility]  site and the contact/phone number information for the office was given to the patient as well.   Patients information was sent to the preferred site and pt was made aware that clerical would be calling to schedule the evaluation appointment. Nurse encouraged her to call the site if she does not hear from clerical beforehand. Patient Agreed.  Patient did not voice any additional questions or concerns at this time.   Patient is aware current/past complaints,  any relevant dx, additional referrals and treatment/options will be discussed at time of evaluation/consultation appointment.   Patient is in agreement with plan.    Patient was very appreciative of the f/u call and referral placement.     Nurse wished her well and the CS referral was closed.

## 2025-05-01 ENCOUNTER — EVALUATION (OUTPATIENT)
Dept: PHYSICAL THERAPY | Facility: CLINIC | Age: 65
End: 2025-05-01
Payer: COMMERCIAL

## 2025-05-01 VITALS — SYSTOLIC BLOOD PRESSURE: 118 MMHG | DIASTOLIC BLOOD PRESSURE: 70 MMHG

## 2025-05-01 DIAGNOSIS — M54.42 ACUTE LEFT-SIDED LOW BACK PAIN WITH LEFT-SIDED SCIATICA: Primary | ICD-10-CM

## 2025-05-01 PROCEDURE — 97110 THERAPEUTIC EXERCISES: CPT

## 2025-05-01 PROCEDURE — 97112 NEUROMUSCULAR REEDUCATION: CPT

## 2025-05-01 PROCEDURE — 97161 PT EVAL LOW COMPLEX 20 MIN: CPT

## 2025-05-01 NOTE — LETTER
May 1, 2025    Nadine Woodson MD  [address] [facility] [address].  [address]  [facility] [address]    Patient: Clotilde Johnson  YOB: 1960   Date of Visit: 2025      Dear Dr. Nadine Woodson MD:    One of your patients, Clotilde Johnson, was referred to me by the [facility] program.  Please see the evaluation summary attached below.  If care is required beyond 30 days to help your patient reach their goals, I will send you a request for signature on the plan of care.    If you have any questions or concerns, please don't hesitate to call.      Sincerely,    Olivia Cheng, PT      Primary Care Provider:      I certify that I have read the below Plan of Care and certify the need for these services furnished under this plan of treatment while under my care.                    Nadine Woodson MD  [address] [facility] [address].  [address]  [place] [phone]  Via Fax: [phone]           PT Evaluation     Today's date: 2025  Patient name: Clotilde Johnson  : 1960  MRN: [id number]  Referring provider: Jairon Diggs, PT  Dx:   Encounter Diagnosis     ICD-10-CM    1. Acute left-sided low back pain with left-sided sciatica  M54.42           Start Time: 1508          Assessment  Impairments: abnormal muscle tone, activity intolerance, impaired physical strength, pain with function and poor posture   Symptom irritability: moderate    Assessment details: Clotilde Johnson  is a pleasant 65 y.o. female who presents as moderate risk via the comp spine program for LBP and L radicular pain. Demonstrates direction preference/ centralization reducing L LE radiculopathy as well as increased restrictions in the B parapsinals, glutes and QL. Neurological exam shows decreased L LE light touch sensation and decreased reflexes B. (+) slump on L. Hip and rest of spine exam (-). No red flag sx. Patient notes more pain in her neck- will add in another visit. Provided HEP focused on centralization, nerve mobility  and glute mobility. Patient demonstrated understanding.       Problem List:  1) centralization with extension / direction preference   2) glute/ posterior chain restriction and tenderness   3) glute/ hip mobility deficits     Tolerated session without adverse effects.  Pt. will benefit from skilled PT services that includes manual therapy techniques to enhance tissue extensibility, neuromuscular re-education to facilitate motor control, therapeutic exercise to increase functional mobility, and modalities prn to reduce pain and inflammation.     Access Code: [email or web address]  URL: [email or web address]  Date: 05/01/2025  Prepared by: Olivia Cheng    Exercises  - Static Prone on Elbows  - 1 x daily - 7 x weekly - 10 reps  - Supine Sciatic Nerve Glide  - 1 x daily - 7 x weekly - 10 reps  - Supine Figure 4 Piriformis Stretch  - 1 x daily - 7 x weekly - 3 reps - 30 hold  Understanding of Dx/Px/POC: good     Prognosis: good    Goals  Short Term Goals: to be achieved by 4 weeks  1) Patient to be independent with basic HEP.  2) Decrease pain to 3/10 at its worst.  3) Increase lumbar spine ROM by 50% in all deficient planes.   4) Increase LE strength by 1/2 MMT grade in all deficient planes.  5) Patient to report decreased sleep interruption secondary to pain.  6) Increase standing and walking tolerance to > 60 minutes without increased pain or limitation     Long Term Goals: to be achieved by discharge  1) FOTO equal to or greater than MDC   2) Patient to be independent with comprehensive HEP.  3) Abolish pain for improved quality of life.  4) Lumbar spine ROM WNL all planes to improve a/iadls.  5) Increase LE strength to 5/5 MMT grade in all planes to improve a/iadls.  6) Patient to report no sleep interruption secondary to pain.  7) ncrease standing and walking tolerance to > 90 minutes without increased pain or limitation     Plan  Patient would benefit from: PT eval and skilled physical therapy  Referral  "necessary: No  Planned modality interventions: thermotherapy: hydrocollator packs, TENS and cryotherapy    Planned therapy interventions: IASTM, joint mobilization, kinesiology taping, nerve gliding, neuromuscular re-education, patient/caregiver education, postural training, strengthening, stretching, therapeutic activities, therapeutic exercise and home exercise program    Frequency: 2x week  Treatment plan discussed with: patient  Plan details: 8-10 visits moderate risk start back       Subjective Evaluation    History of Present Illness  Mechanism of injury: Clotilde Johnson presents with c/c of acute LBP and R LE pain to the ankle. Symptoms began 1-2 months ago with no known mechanism of injury. Reports numbness and tingling in the LE. Denies change in bowel/ bladder function. Reports a couple of falls. Burning sensation in the LE and the hip when walking. Reports fall on back 2 months ago.   Aggravating factors: walking < 1 mile, getting out of bed and bending   Relieving factors: tramadol decreases the pain  24hr pain pattern: 8/10 (current), 9/10 (best), 10/10 (worst), location: L glute and the lateral lE , descriptors: sharp aching and stabbing   Imagin. There is a thoracolumbar scoliosis with retrolisthesis at L1-2 and L2-3 and   grade 1 spondylolisthesis disease at L3-4, L4-5 and L5-S1     2. There are multilevel lumbar spondylotic changes.     3. There are disc protrusion at L1-2, L2-3, L4-5 and L5-S1     4. At L1 there is a compression fracture deformity of the superior endplate and   body not fully included on this study.     Previous treatments: none   Patient goals: \" to feel better\"    Interpretation services utilized [id number]          Recurrent probem    Quality of life: good        Objective     Concurrent Complaints  Positive for night pain and disturbed sleep. Negative for bladder dysfunction, bowel dysfunction and saddle (S4) numbness    Postural Observations  Seated posture: poor  Standing " posture: poor    Additional Postural Observation Details  Forward head and flexion posture decreased glute activation     Palpation     Additional Palpation Details  TTP in the hip flexors B with increased tenderness in the R quad   B glute atrophy with referral pain into the TTP   Spine tenderness in C through L equal   B QL and parapsinal tenderness in QL and lumbar/ thoracic paraspinals     Neurological Testing     Sensation     Lumbar   Left   Diminished: light touch    Right   Intact: light touch    Comments   Left light touch: L2-S1  Right light touch: L2-S1    Reflexes   Left   Patellar (L4): trace (1+)  Achilles (S1): trace (1+)  Clonus sign: negative    Right   Patellar (L4): trace (1+)  Achilles (S1): trace (1+)  Clonus sign: negative    Active Range of Motion     Lumbar   Flexion: Active lumbar flexion: stabbing in the back no change in LE pain actually no it is stonger.  WFL and with pain  Extension: Active lumbar extension: back little pain le pain little btter.  with pain Restriction level: maximal  Left lateral flexion:  WFL  Right lateral flexion: Active right lumbar lateral flexion: less leg pain.  with pain Restriction level: minimal  Left rotation:  WFL  Right rotation:  WFL    Additional Active Range of Motion Details  Leg pain standin/10     Joint Play     Hypomobile: T8, T9, T10, T11, T12, L2 and L4     Pain: T8, T9, T10, T11, T12, L1, L2, L3, L4, L5 and S1     Strength/Myotome Testing     Lumbar   Left   Heel walk: normal  Toe walk: normal    Right   Heel walk: normal  Toe walk: normal    Left Hip   Planes of Motion   Flexion: 4  Extension: 3+  Abduction: 4-  Adduction: 4  External rotation: 4-  Internal rotation: 4    Right Hip   Planes of Motion   Flexion: 4  Extension: 3+  Abduction: 4-  Adduction: 4  External rotation: 4  Internal rotation: 4    Left Knee   Flexion: 4  Extension: 4    Right Knee   Flexion: 4  Extension: 4    Left Ankle/Foot   Dorsiflexion: 5    Right Ankle/Foot  "  Dorsiflexion: 5    Muscle Activation   Patient able to activate left transverse abdominals, left multifidus, right transverse abdominals and right multifidus.     Tests     Lumbar     Left   Positive slump test.   Negative crossed SLR, passive SLR and quadrant.     Right   Negative crossed SLR, passive SLR, quadrant and slump test.     Left Pelvic Girdle/Sacrum   Negative: active SLR test.     Right Pelvic Girdle/Sacrum   Negative: active SLR test.     Left Hip   Negative CAMERON and FADIR.     Right Hip   Negative CAMERON and FADIR.     Additional Tests Details  \" Heat in the left leg\"       Flowsheet Rows      Flowsheet Row Most Recent Value   PT/OT G-Codes    Current Score 30   Projected Score 43               Precautions: parkinsons disease       Manuals 5/1            Glute release              QL and paraspinal tenderness             Op with lumbar ext                           Neuro Re ed 5/1            PETER (HEP) 2'             Sciatic nerve glide (HEP) X15             LTR              bridges             Clamshells                                        Ther Ex 5/1            Piriformis stretch (HEP) 3x30\"  away             Hamstring stretch              SKTC              LP                                                                 Ther Activity 5/1            Patient education  KR            Bike                                        FOTO              Modalities 5/1            MHP                                "

## 2025-05-01 NOTE — PROGRESS NOTES
PT Evaluation     Today's date: 2025  Patient name: Clotilde Johnson  : 1960  MRN: [id number]  Referring provider: Jairon Diggs, PT  Dx:   Encounter Diagnosis     ICD-10-CM    1. Acute left-sided low back pain with left-sided sciatica  M54.42           Start Time: 1508          Assessment  Impairments: abnormal muscle tone, activity intolerance, impaired physical strength, pain with function and poor posture   Symptom irritability: moderate    Assessment details: Clotilde Johnson  is a pleasant 65 y.o. female who presents as moderate risk via the comp spine program for LBP and L radicular pain. Demonstrates direction preference/ centralization reducing L LE radiculopathy as well as increased restrictions in the B parapsinals, glutes and QL. Neurological exam shows decreased L LE light touch sensation and decreased reflexes B. (+) slump on L. Hip and rest of spine exam (-). No red flag sx. Patient notes more pain in her neck- will add in another visit. Provided HEP focused on centralization, nerve mobility and glute mobility. Patient demonstrated understanding.       Problem List:  1) centralization with extension / direction preference   2) glute/ posterior chain restriction and tenderness   3) glute/ hip mobility deficits     Tolerated session without adverse effects.  Pt. will benefit from skilled PT services that includes manual therapy techniques to enhance tissue extensibility, neuromuscular re-education to facilitate motor control, therapeutic exercise to increase functional mobility, and modalities prn to reduce pain and inflammation.     Access Code: [email or web address]  URL: [email or web address]  Date: 2025  Prepared by: Olivia Cheng    Exercises  - Static Prone on Elbows  - 1 x daily - 7 x weekly - 10 reps  - Supine Sciatic Nerve Glide  - 1 x daily - 7 x weekly - 10 reps  - Supine Figure 4 Piriformis Stretch  - 1 x daily - 7 x weekly - 3 reps - 30 hold  Understanding of  Dx/Px/POC: good     Prognosis: good    Goals  Short Term Goals: to be achieved by 4 weeks  1) Patient to be independent with basic HEP.  2) Decrease pain to 3/10 at its worst.  3) Increase lumbar spine ROM by 50% in all deficient planes.   4) Increase LE strength by 1/2 MMT grade in all deficient planes.  5) Patient to report decreased sleep interruption secondary to pain.  6) Increase standing and walking tolerance to > 60 minutes without increased pain or limitation     Long Term Goals: to be achieved by discharge  1) FOTO equal to or greater than MDC   2) Patient to be independent with comprehensive HEP.  3) Abolish pain for improved quality of life.  4) Lumbar spine ROM WNL all planes to improve a/iadls.  5) Increase LE strength to 5/5 MMT grade in all planes to improve a/iadls.  6) Patient to report no sleep interruption secondary to pain.  7) ncrease standing and walking tolerance to > 90 minutes without increased pain or limitation     Plan  Patient would benefit from: PT eval and skilled physical therapy  Referral necessary: No  Planned modality interventions: thermotherapy: hydrocollator packs, TENS and cryotherapy    Planned therapy interventions: IASTM, joint mobilization, kinesiology taping, nerve gliding, neuromuscular re-education, patient/caregiver education, postural training, strengthening, stretching, therapeutic activities, therapeutic exercise and home exercise program    Frequency: 2x week  Treatment plan discussed with: patient  Plan details: 8-10 visits moderate risk start back       Subjective Evaluation    History of Present Illness  Mechanism of injury: Clotilde Johnson presents with c/c of acute LBP and R LE pain to the ankle. Symptoms began 1-2 months ago with no known mechanism of injury. Reports numbness and tingling in the LE. Denies change in bowel/ bladder function. Reports a couple of falls. Burning sensation in the LE and the hip when walking. Reports fall on back 2 months ago.  "  Aggravating factors: walking < 1 mile, getting out of bed and bending   Relieving factors: tramadol decreases the pain  24hr pain pattern: 8/10 (current), 9/10 (best), 10/10 (worst), location: L glute and the lateral lE , descriptors: sharp aching and stabbing   Imagin. There is a thoracolumbar scoliosis with retrolisthesis at L1-2 and L2-3 and   grade 1 spondylolisthesis disease at L3-4, L4-5 and L5-S1     2. There are multilevel lumbar spondylotic changes.     3. There are disc protrusion at L1-2, L2-3, L4-5 and L5-S1     4. At L1 there is a compression fracture deformity of the superior endplate and   body not fully included on this study.     Previous treatments: none   Patient goals: \" to feel better\"    Interpretation services utilized [id number]          Recurrent probem    Quality of life: good        Objective     Concurrent Complaints  Positive for night pain and disturbed sleep. Negative for bladder dysfunction, bowel dysfunction and saddle (S4) numbness    Postural Observations  Seated posture: poor  Standing posture: poor    Additional Postural Observation Details  Forward head and flexion posture decreased glute activation     Palpation     Additional Palpation Details  TTP in the hip flexors B with increased tenderness in the R quad   B glute atrophy with referral pain into the TTP   Spine tenderness in C through L equal   B QL and parapsinal tenderness in QL and lumbar/ thoracic paraspinals     Neurological Testing     Sensation     Lumbar   Left   Diminished: light touch    Right   Intact: light touch    Comments   Left light touch: L2-S1  Right light touch: L2-S1    Reflexes   Left   Patellar (L4): trace (1+)  Achilles (S1): trace (1+)  Clonus sign: negative    Right   Patellar (L4): trace (1+)  Achilles (S1): trace (1+)  Clonus sign: negative    Active Range of Motion     Lumbar   Flexion: Active lumbar flexion: stabbing in the back no change in LE pain actually no it is stonger.  WFL and with " "pain  Extension: Active lumbar extension: back little pain le pain little btter.  with pain Restriction level: maximal  Left lateral flexion:  WFL  Right lateral flexion: Active right lumbar lateral flexion: less leg pain.  with pain Restriction level: minimal  Left rotation:  WFL  Right rotation:  WFL    Additional Active Range of Motion Details  Leg pain standin/10     Joint Play     Hypomobile: T8, T9, T10, T11, T12, L2 and L4     Pain: T8, T9, T10, T11, T12, L1, L2, L3, L4, L5 and S1     Strength/Myotome Testing     Lumbar   Left   Heel walk: normal  Toe walk: normal    Right   Heel walk: normal  Toe walk: normal    Left Hip   Planes of Motion   Flexion: 4  Extension: 3+  Abduction: 4-  Adduction: 4  External rotation: 4-  Internal rotation: 4    Right Hip   Planes of Motion   Flexion: 4  Extension: 3+  Abduction: 4-  Adduction: 4  External rotation: 4  Internal rotation: 4    Left Knee   Flexion: 4  Extension: 4    Right Knee   Flexion: 4  Extension: 4    Left Ankle/Foot   Dorsiflexion: 5    Right Ankle/Foot   Dorsiflexion: 5    Muscle Activation   Patient able to activate left transverse abdominals, left multifidus, right transverse abdominals and right multifidus.     Tests     Lumbar     Left   Positive slump test.   Negative crossed SLR, passive SLR and quadrant.     Right   Negative crossed SLR, passive SLR, quadrant and slump test.     Left Pelvic Girdle/Sacrum   Negative: active SLR test.     Right Pelvic Girdle/Sacrum   Negative: active SLR test.     Left Hip   Negative CAMERON and FADIR.     Right Hip   Negative CAMERON and FADIR.     Additional Tests Details  \" Heat in the left leg\"       Flowsheet Rows      Flowsheet Row Most Recent Value   PT/OT G-Codes    Current Score 30   Projected Score 43               Precautions: parkinsons disease       Manuals             Glute release              QL and paraspinal tenderness             Op with lumbar ext                           Neuro Re ed        " "     PETER (HEP) 2'             Sciatic nerve glide (HEP) X15             LTR              bridges             Clamshells                                        Ther Ex 5/1            Piriformis stretch (HEP) 3x30\"  away             Hamstring stretch              SK              LP                                                                 Ther Activity 5/1            Patient education  KR            Bike                                        FOTO              Modalities 5/1            MHP                                "

## 2025-05-07 ENCOUNTER — OFFICE VISIT (OUTPATIENT)
Dept: PHYSICAL THERAPY | Facility: CLINIC | Age: 65
End: 2025-05-07
Payer: COMMERCIAL

## 2025-05-07 DIAGNOSIS — M54.42 ACUTE LEFT-SIDED LOW BACK PAIN WITH LEFT-SIDED SCIATICA: Primary | ICD-10-CM

## 2025-05-07 PROCEDURE — 97112 NEUROMUSCULAR REEDUCATION: CPT

## 2025-05-07 PROCEDURE — 97110 THERAPEUTIC EXERCISES: CPT

## 2025-05-07 NOTE — PROGRESS NOTES
"Daily Note     Today's date: 2025  Patient name: Clotilde Johnson  : 1960  MRN: [id number]  Referring provider: Jairon Diggs, BETH  Dx:   Encounter Diagnosis     ICD-10-CM    1. Acute left-sided low back pain with left-sided sciatica  M54.42                      Subjective: \" The pain is a 9/10\"       Objective: See treatment diary below      Assessment: Clotilde presents to therapy with LBP.  Reported she also has increased neck pain on the R is seeing someone at [facility] discussed adding visits in this week. Note decreased pain in the LE with prone positioning. Note increased glute  restrictions- mild improvement in mobility post. Patient in no visible distress throughout session but reports pain is very high. Tolerated session without adverse effects. Recommend continued skilled therapy to improve overall strength and mobility for functional return with decreased compensation and pain.        Plan: Continue per plan of care.      Precautions: parkinsons disease       Manuals            Glute release   Stick KR            QL and paraspinal release   KR           Op with lumbar ext   Lumbar PA grade II                         Neuro Re ed            PETER (HEP) 2'  10' with CP            Sciatic nerve glide (HEP) X15  X15 B            LTR   5x10\" to R            bridges             Clamshells                                        Ther Ex            Piriformis stretch (HEP) 3x30\"  away  3x30\" away            Hamstring stretch              SKTC   5x10\" B            LP                                                                 Ther Activity            Patient education  KR KR           Bike                                        FOTO              Modalities            MHP              CP  With prone exercises x10'                 "

## 2025-05-12 ENCOUNTER — APPOINTMENT (OUTPATIENT)
Dept: PHYSICAL THERAPY | Facility: CLINIC | Age: 65
End: 2025-05-12
Payer: COMMERCIAL

## 2025-05-14 ENCOUNTER — APPOINTMENT (OUTPATIENT)
Dept: PHYSICAL THERAPY | Facility: CLINIC | Age: 65
End: 2025-05-14
Payer: COMMERCIAL

## 2025-05-14 NOTE — PROGRESS NOTES
"Daily Note     Today's date: 2025  Patient name: Clotilde Johnson  : 1960  MRN: [id number]  Referring provider: Jairon Diggs, BETH  Dx: No diagnosis found.               Subjective: Pt presents to PT reporting      Objective: See treatment diary below      Assessment: Tolerated treatment well. Patient demonstrated fatigue post treatment, exhibited good technique with therapeutic exercises, and would benefit from continued PT to increase flexibility, strength and function.      Plan: Continue per plan of care.      Precautions: parkinsons disease       Manuals           Glute release   Stick KR            QL and paraspinal release   KR           Op with lumbar ext   Lumbar PA grade II                         Neuro Re ed           PETER (HEP) 2'  10' with CP            Sciatic nerve glide (HEP) X15  X15 B            LTR   5x10\" to R            bridges             Clamshells                                        Ther Ex           Piriformis stretch (HEP) 3x30\"  away  3x30\" away            Hamstring stretch              SKTC   5x10\" B            LP                                                                 Ther Activity           Patient education  KR KR           Bike                                        FOTO              Modalities           MHP              CP  With prone exercises x10'                   "

## 2025-05-19 ENCOUNTER — OFFICE VISIT (OUTPATIENT)
Dept: PHYSICAL THERAPY | Facility: CLINIC | Age: 65
End: 2025-05-19
Payer: COMMERCIAL

## 2025-05-19 DIAGNOSIS — M54.42 ACUTE LEFT-SIDED LOW BACK PAIN WITH LEFT-SIDED SCIATICA: Primary | ICD-10-CM

## 2025-05-19 PROCEDURE — 97110 THERAPEUTIC EXERCISES: CPT

## 2025-05-19 PROCEDURE — 97140 MANUAL THERAPY 1/> REGIONS: CPT

## 2025-05-19 NOTE — PROGRESS NOTES
"Daily Note     Today's date: 2025  Patient name: Clotilde Johnson  : 1960  MRN: [id number]  Referring provider: Jairon Diggs PT  Dx:   Encounter Diagnosis     ICD-10-CM    1. Acute left-sided low back pain with left-sided sciatica  M54.42           Start Time: 1400          Subjective: \" I have less pain today\"       Objective: See treatment diary below      Assessment: Clotilde presents to therapy with glute/ LBP. Note continued gross R QL restriction with R side bending noted in prone- mild improvement post release. Note gross glute and core weakness with fatigue during activation. Tolerated session without adverse effects. Recommend continued skilled therapy to improve overall strength and mobility for functional return with decreased compensation and pain.        Plan: Continue per plan of care.      Precautions: parkinsons disease       Manuals           Glute release   Stick KR  Stick KR           QL and paraspinal release   KR KR          Op with lumbar ext   Lumbar PA grade II  Lumbar PA grade II                        Neuro Re ed           PETER (HEP) 2'  10' with CP            Sciatic nerve glide (HEP) X15  X15 B            LTR   5x10\" to R  5x10\" to R           bridges   15          Clamshells    nv          Squats              Standing hip abd    X15 B           Ther Ex           Piriformis stretch (HEP) 3x30\"  away  3x30\" away  3x30\" away           Hamstring stretch              SKTC   5x10\" B  5x10\" B           LP   65# x 20                                                               Ther Activity           Patient education  KR KR KR           Bike              Sidestepping    X5 laps                        FOTO              Modalities           MHP              CP  With prone exercises x10'                   "

## 2025-05-21 ENCOUNTER — APPOINTMENT (OUTPATIENT)
Dept: PHYSICAL THERAPY | Facility: CLINIC | Age: 65
End: 2025-05-21
Payer: COMMERCIAL

## 2025-05-21 NOTE — PROGRESS NOTES
"Daily Note     Today's date: 2025  Patient name: Clotilde Johnson  : 1960  MRN: [id number]  Referring provider: Jairon Diggs, BETH  Dx: No diagnosis found.               Subjective: Pt presents to PT reporting      Objective: See treatment diary below      Assessment: Tolerated treatment well. Patient demonstrated fatigue post treatment, exhibited good technique with therapeutic exercises, and would benefit from continued PT to increase flexibility, strength and function.      Plan: Continue per plan of care.      Precautions: parkinsons disease       Manuals          Glute release   Stick KR  Stick KR           QL and paraspinal release   KR KR          Op with lumbar ext   Lumbar PA grade II  Lumbar PA grade II                        Neuro Re ed          PETER (HEP) 2'  10' with CP            Sciatic nerve glide (HEP) X15  X15 B            LTR   5x10\" to R  5x10\" to R           bridges   15          Clamshells    nv          Squats              Standing hip abd    X15 B           Ther Ex          Piriformis stretch (HEP) 3x30\"  away  3x30\" away  3x30\" away           Hamstring stretch              SKTC   5x10\" B  5x10\" B           LP   65# x 20                                                               Ther Activity          Patient education  KR KR KR           Bike              Sidestepping    X5 laps                        FOTO              Modalities          MHP              CP  With prone exercises x10'                     "

## 2025-05-28 ENCOUNTER — APPOINTMENT (OUTPATIENT)
Dept: PHYSICAL THERAPY | Facility: CLINIC | Age: 65
End: 2025-05-28
Payer: COMMERCIAL

## 2025-05-28 NOTE — PROGRESS NOTES
"Daily Note     Today's date: 2025  Patient name: Clotilde Johnson  : 1960  MRN: [id number]  Referring provider: Jairon Diggs PT  Dx: No diagnosis found.               Subjective: \"      Objective: See treatment diary below      Assessment: Clotilde presents to therapy with LBP Tolerated session without adverse effects. Recommend continued skilled therapy to improve overall strength and mobility for functional return with decreased compensation and pain.        Plan: Continue per plan of care.      Precautions: parkinsons disease       Manuals          Glute release   Stick KR  Stick KR           QL and paraspinal release   KR KR          Op with lumbar ext   Lumbar PA grade II  Lumbar PA grade II                        Neuro Re ed          PETER (HEP) 2'  10' with CP            Sciatic nerve glide (HEP) X15  X15 B            LTR   5x10\" to R  5x10\" to R           bridges   15          Clamshells    nv          Squats              Standing hip abd    X15 B           Ther Ex          Piriformis stretch (HEP) 3x30\"  away  3x30\" away  3x30\" away           Hamstring stretch              SKTC   5x10\" B  5x10\" B           LP   65# x 20                                                               Ther Activity          Patient education  KR KR KR           Bike              Sidestepping    X5 laps                        FOTO              Modalities          MHP              CP  With prone exercises x10'                     "